# Patient Record
Sex: MALE | Race: OTHER
[De-identification: names, ages, dates, MRNs, and addresses within clinical notes are randomized per-mention and may not be internally consistent; named-entity substitution may affect disease eponyms.]

---

## 2020-06-01 PROBLEM — Z00.00 ENCOUNTER FOR PREVENTIVE HEALTH EXAMINATION: Status: ACTIVE | Noted: 2020-06-01

## 2020-08-14 ENCOUNTER — TRANSCRIPTION ENCOUNTER (OUTPATIENT)
Age: 20
End: 2020-08-14

## 2020-11-23 ENCOUNTER — APPOINTMENT (OUTPATIENT)
Dept: UROLOGY | Facility: CLINIC | Age: 20
End: 2020-11-23
Payer: COMMERCIAL

## 2020-11-23 VITALS
SYSTOLIC BLOOD PRESSURE: 126 MMHG | WEIGHT: 184 LBS | RESPIRATION RATE: 14 BRPM | DIASTOLIC BLOOD PRESSURE: 82 MMHG | BODY MASS INDEX: 24.92 KG/M2 | HEIGHT: 72 IN | HEART RATE: 89 BPM | OXYGEN SATURATION: 95 % | TEMPERATURE: 98.2 F

## 2020-11-23 DIAGNOSIS — K90.0 CELIAC DISEASE: ICD-10-CM

## 2020-11-23 DIAGNOSIS — Z78.9 OTHER SPECIFIED HEALTH STATUS: ICD-10-CM

## 2020-11-23 DIAGNOSIS — Z80.3 FAMILY HISTORY OF MALIGNANT NEOPLASM OF BREAST: ICD-10-CM

## 2020-11-23 DIAGNOSIS — Z80.1 FAMILY HISTORY OF MALIGNANT NEOPLASM OF TRACHEA, BRONCHUS AND LUNG: ICD-10-CM

## 2020-11-23 DIAGNOSIS — F17.200 NICOTINE DEPENDENCE, UNSPECIFIED, UNCOMPLICATED: ICD-10-CM

## 2020-11-23 DIAGNOSIS — Z65.8 OTHER SPECIFIED PROBLEMS RELATED TO PSYCHOSOCIAL CIRCUMSTANCES: ICD-10-CM

## 2020-11-23 PROCEDURE — 99203 OFFICE O/P NEW LOW 30 MIN: CPT

## 2020-11-23 RX ORDER — LISDEXAMFETAMINE DIMESYLATE 40 MG/1
40 CAPSULE ORAL
Qty: 30 | Refills: 0 | Status: DISCONTINUED | COMMUNITY
Start: 2020-10-26

## 2020-11-23 RX ORDER — LISDEXAMFETAMINE DIMESYLATE 30 MG/1
30 CAPSULE ORAL
Qty: 30 | Refills: 0 | Status: DISCONTINUED | COMMUNITY
Start: 2020-10-26

## 2020-11-23 RX ORDER — VORTIOXETINE 20 MG/1
20 TABLET, FILM COATED ORAL
Qty: 90 | Refills: 0 | Status: ACTIVE | COMMUNITY
Start: 2020-06-06

## 2020-11-23 RX ORDER — SILDENAFIL 25 MG/1
25 TABLET ORAL
Qty: 3 | Refills: 0 | Status: DISCONTINUED | COMMUNITY
Start: 2020-11-07

## 2020-11-23 NOTE — REVIEW OF SYSTEMS
[Loss of interest] : loss of interest in sexual activity [Poor quality erections] : Poor quality erections [No erections] : no erections [Anxiety] : anxiety [Depression] : depression [Negative] : Heme/Lymph [Recent Weight Gain (___ Lbs)] : recent [unfilled] ~Ulb weight gain [Erectile Dysfunction] : erectile dysfunction

## 2020-11-23 NOTE — HISTORY OF PRESENT ILLNESS
[FreeTextEntry1] : He is a 20-year-old man who is seen today for initial visit. For more than 6 months, he is complaining of decreased interest for sexual desire and also difficulty maintaining and obtaining erections. He does not have significant urinary symptoms. He has history of panic disorder. He once tried Viagra with success. Labs showed testosterone 478 in August 2020. He also has gained weight which has been difficult for him to lose. He is trying to work out but has not gained any muscle mass, according to the patient. He is supposed to see endocrinology soon.

## 2020-11-23 NOTE — ASSESSMENT
[FreeTextEntry1] : We discussed the underlying mechanism for erections, pathophysiology of erectile dysfunction (ED) and treatment options. Role of smoking, diabetes, hypertension, hyperlipidemia, coronary artery disease and treatment for benign and malignant prostate conditions was discussed as some of the more common causes of ED. Exercise, weight loss and a healthy lifestyle can be beneficial. We discussed testosterone (T) and its possible link to increased desire for sexual activity, feeling energetic, muscle mass, etc. Low T as a cause for ED is less clear. Mechanism by which PDE-5 inhibitors improve erections was discussed. Medications in this category include Viagra, Levitra, Staxyn, Cialis and Stendra. As needed versus daily dosing was discussed. Also, use of vacuum erection device, urethral suppository (MUSE), intracavernosal injections (Edex, Trimix, Caverject) and surgical placement of inflatable penile prosthesis were discussed in detail. Side effects of each treatment was reviewed and questions were answered. Labs were normal. He can try daily Cialis first. Side effects discussed.

## 2020-11-23 NOTE — PHYSICAL EXAM
[General Appearance - Well Developed] : well developed [General Appearance - Well Nourished] : well nourished [Normal Appearance] : normal appearance [Well Groomed] : well groomed [General Appearance - In No Acute Distress] : no acute distress [Edema] : no peripheral edema [Respiration, Rhythm And Depth] : normal respiratory rhythm and effort [Exaggerated Use Of Accessory Muscles For Inspiration] : no accessory muscle use [Abdomen Soft] : soft [Abdomen Tenderness] : non-tender [Costovertebral Angle Tenderness] : no ~M costovertebral angle tenderness [Urethral Meatus] : meatus normal [Penis Abnormality] : normal circumcised penis [Urinary Bladder Findings] : the bladder was normal on palpation [Scrotum] : the scrotum was normal [Epididymis] : the epididymides were normal [Testes Tenderness] : no tenderness of the testes [Testes Mass (___cm)] : there were no testicular masses [Normal Station and Gait] : the gait and station were normal for the patient's age [] : no rash [No Focal Deficits] : no focal deficits [Oriented To Time, Place, And Person] : oriented to person, place, and time [Affect] : the affect was normal [Mood] : the mood was normal [Not Anxious] : not anxious [Inguinal Lymph Nodes Enlarged Bilaterally] : inguinal

## 2021-05-07 ENCOUNTER — APPOINTMENT (OUTPATIENT)
Dept: UROLOGY | Facility: CLINIC | Age: 21
End: 2021-05-07

## 2021-11-29 ENCOUNTER — RX RENEWAL (OUTPATIENT)
Age: 21
End: 2021-11-29

## 2022-10-19 ENCOUNTER — NON-APPOINTMENT (OUTPATIENT)
Age: 22
End: 2022-10-19

## 2022-11-16 ENCOUNTER — APPOINTMENT (OUTPATIENT)
Dept: ENDOCRINOLOGY | Facility: CLINIC | Age: 22
End: 2022-11-16

## 2022-11-16 VITALS
TEMPERATURE: 98.1 F | RESPIRATION RATE: 14 BRPM | SYSTOLIC BLOOD PRESSURE: 138 MMHG | OXYGEN SATURATION: 96 % | DIASTOLIC BLOOD PRESSURE: 85 MMHG | WEIGHT: 209 LBS | HEART RATE: 72 BPM | BODY MASS INDEX: 28.31 KG/M2 | HEIGHT: 72 IN

## 2022-11-16 DIAGNOSIS — I10 ESSENTIAL (PRIMARY) HYPERTENSION: ICD-10-CM

## 2022-11-16 DIAGNOSIS — R94.6 ABNORMAL RESULTS OF THYROID FUNCTION STUDIES: ICD-10-CM

## 2022-11-16 DIAGNOSIS — R68.82 DECREASED LIBIDO: ICD-10-CM

## 2022-11-16 DIAGNOSIS — N52.9 MALE ERECTILE DYSFUNCTION, UNSPECIFIED: ICD-10-CM

## 2022-11-16 PROCEDURE — 99204 OFFICE O/P NEW MOD 45 MIN: CPT

## 2022-11-16 RX ORDER — VORTIOXETINE 10 MG/1
10 TABLET, FILM COATED ORAL
Qty: 90 | Refills: 0 | Status: ACTIVE | COMMUNITY
Start: 2022-08-30

## 2022-11-16 RX ORDER — CLONAZEPAM 0.5 MG/1
0.5 TABLET ORAL
Qty: 60 | Refills: 0 | Status: COMPLETED | COMMUNITY
Start: 2020-11-06 | End: 2022-11-16

## 2022-11-16 RX ORDER — LISDEXAMFETAMINE DIMESYLATE 30 MG/1
30 CAPSULE ORAL
Refills: 0 | Status: ACTIVE | COMMUNITY
Start: 2022-11-16

## 2022-11-16 RX ORDER — TADALAFIL 5 MG/1
5 TABLET ORAL
Qty: 30 | Refills: 5 | Status: COMPLETED | COMMUNITY
Start: 2020-11-23 | End: 2022-11-16

## 2022-11-16 RX ORDER — ATENOLOL 50 MG/1
50 TABLET ORAL
Qty: 90 | Refills: 0 | Status: ACTIVE | COMMUNITY
Start: 2022-04-25

## 2022-11-16 NOTE — CONSULT LETTER
[Dear  ___] : Dear  [unfilled], [Consult Letter:] : I had the pleasure of evaluating your patient, [unfilled]. [Please see my note below.] : Please see my note below. [Consult Closing:] : Thank you very much for allowing me to participate in the care of this patient.  If you have any questions, please do not hesitate to contact me. [Sincerely,] : Sincerely, [FreeTextEntry3] : Patrick Godoy DO\par Division of Endocrinology\par [facility]\par [facility]\par  of Medicine\par [employer] [employer]\par Director of [facility] [FreeTextEntry2] : Dr. Omayra Ramires\par [address]\[place] [address]

## 2022-11-16 NOTE — REASON FOR VISIT
[Initial Evaluation] : an initial evaluation [Hypogonadism] : hypogonadism [FreeTextEntry2] : Dr. Omayra Ramires

## 2022-11-16 NOTE — REVIEW OF SYSTEMS
[Fatigue] : fatigue [Recent Weight Gain (___ Lbs)] : recent weight gain: [unfilled] lbs [Visual Field Defect] : no visual field defect [Dysphagia] : no dysphagia [Dysphonia] : no dysphonia [Chest Pain] : no chest pain [Palpitations] : palpitations [Shortness Of Breath] : no shortness of breath [Nausea] : no nausea [Constipation] : no constipation [Vomiting] : no vomiting [Diarrhea] : no diarrhea [Polyuria] : no polyuria [Joint Pain] : no joint pain [Headaches] : no headaches [Anxiety] : anxiety [Polydipsia] : no polydipsia [Cold Intolerance] : no cold intolerance [Heat Intolerance] : no heat intolerance [All other systems negative] : All other systems negative [FreeTextEntry5] : with panic attacks

## 2022-11-16 NOTE — ASSESSMENT
[FreeTextEntry1] : 21 y/o M w/ multiple nonspecific concerns as below:\par \par 1. Erectile Dysfunction- Had been seen by endocrine and urology back in 2020 with relatively normal testosterone. ED is less of an issue currently and more concerned about libido but still with inconsistent spontaneous erections and decreased quality of erections. Suspect multifactorial causes such as psychogenic, medication induced from atenolol, Trintellix, and/or Vyvanse (all of which have reports up to 3-5% of erectile or sexual dysfunction). \par \par 2. Low libido- also likely multifactorial but can recheck testosterone with sex hormone binding globulin, prolactin, TFTs. Recommend sleep apnea screen given weight gain and hx of snoring and nighttime awakenings. \par \par 3. Abnormal TFTs- last TFTs 6/2022 with borderline TSH. Repeat today to assess for hypothyroidism as cause for symptoms such as abnormal weight gain and fatigue. \par \par 4. HTN- Would workup for secondary causes given young age with HTN. Check aldosterone, renin, plasma, metanephrines, salivary cortisol for cushing's screen. Consider alternative agent than atenolol with less risk of sexual dysfunction and better BP control, although atenolol would be a good choice for hx of panic disorder.\par \par 5. Abnormal weight gain- expect some weight gain as patient now working from home virtually and less active but still exercising and eating habits have no changed. Would not have anticipated 46 lb. gain in 1 year unless medication dose increases have caused some weight gain. Would r/o Cushing's as there is some axillary striae on exam also with HTN. Check TFTs. Dietary and lifestyle modifications discussed.

## 2022-11-16 NOTE — DATA REVIEWED
[FreeTextEntry1] : Labs 6/2022:\par CBC normal\par CMP normal except AST of 49 and ALT 50\par A1c 5.3%\par Folate 16.5\par Estradiol 33\par TSH 4.44\par T4 7.0\par T3 124\par Thyroid Ab neg.\par Vit D 34.2\par Chol 214\par HDL 46\par \par \par B12 670\par DHEA 793\par Celiac weakly postive\par Testosterone 371\par DHT 18

## 2022-11-16 NOTE — PHYSICAL EXAM
[Alert] : alert [Well Nourished] : well nourished [Healthy Appearance] : healthy appearance [No Acute Distress] : no acute distress [Well Developed] : well developed [EOMI] : extra ocular movement intact [No Proptosis] : no proptosis [Normal Hearing] : hearing was normal [Supple] : the neck was supple [Thyroid Not Enlarged] : the thyroid was not enlarged [No Accessory Muscle Use] : no accessory muscle use [No Respiratory Distress] : no respiratory distress [Normal Rate and Effort] : normal respiratory rate and effort [Normal S1, S2] : normal S1 and S2 [Clear to Auscultation] : lungs were clear to auscultation bilaterally [Normal Rate] : heart rate was normal [Regular Rhythm] : with a regular rhythm [No Edema] : no peripheral edema [No Galactorrhea] : no galactorrhea [Normal Bowel Sounds] : normal bowel sounds [Not Tender] : non-tender [Not Distended] : not distended [Soft] : abdomen soft [Penis Abnormality] : the penis was normal [Testes Swelling] : the testicles were not swollen [Scrotum] : the scrotum was normal [Testes Mass (___cm)] : there were no testicular masses [Normal Testicular Size] : normal testicular size [Normal Pattern Pubic Hair] : normal male pattern pubic hair [No Clubbing, Cyanosis] : no clubbing  or cyanosis of the fingernails [No Involuntary Movements] : no involuntary movements were seen [Normal Strength/Tone] : muscle strength and tone were normal [Abdominal Striae] : abdominal striae present [Oriented x3] : oriented to person, place, and time [Normal Affect] : the affect was normal [Normal Mood] : the mood was normal [de-identified] : +axillary striae

## 2022-11-16 NOTE — HISTORY OF PRESENT ILLNESS
[FreeTextEntry1] : Referred to endocrinology in 2020 \par Seen by urology in 2020 for 6 months hx of decreased interest for sexual desire and also difficulty maintaining and obtaining erections. He did not have significant urinary symptoms. He has history of panic disorder. He once tried Viagra with success. Labs showed testosterone 478 in August 2020. He also had gained weight which had been difficult for him to lose. Recommended trial of Cialis at that time.\par Stopped using Cialis. Noticed erections not as strong.\par Around 11/2021 noticed hair loss in the back of his head. Recommended Minoxidil. Also noticed weight gain 46 lb. in 1 year mostly in waist and chest. \par Has noticed worsening libido, energy level, increased need to nap. \par Works out daily and very active.  \par Never on anabolic steroids. Never on testosterone. Stopped smoking Marijuana around in 2019.\par Started on Trintellix around 2015. Increased by 10 mg summer 2022. Has noticed increased panic attacks with symptoms of dizziness, palpitations, hyperventilation.\par Started on atenolol for HTN in 2020. Also on Vyvanse 50 mg daily \par Presents today for evaluation.\par No headaches, no changes in vision, nausea, vomiting, chest pain, SOB, LE edema, galactorrhea. +gynecomastia. \par No personal hx of DM. No change in shoe or hat size. \par Has noticed increase in abdominal striae and striae under axilla. \par +morning erections but less often. Concerned about quality of erections and libido. \par +snoring. Takes melatonin for sleep.

## 2022-11-22 ENCOUNTER — RESULT REVIEW (OUTPATIENT)
Age: 22
End: 2022-11-22

## 2022-11-22 LAB
ALBUMIN SERPL ELPH-MCNC: 5.1 G/DL
ALDOSTERONE SERUM: 11.2 NG/DL
ALP BLD-CCNC: 111 U/L
ALT SERPL-CCNC: 54 U/L
ANION GAP SERPL CALC-SCNC: 13 MMOL/L
AST SERPL-CCNC: 35 U/L
BASOPHILS # BLD AUTO: 0.08 K/UL
BASOPHILS NFR BLD AUTO: 1.3 %
BILIRUB SERPL-MCNC: 0.3 MG/DL
BUN SERPL-MCNC: 20 MG/DL
CALCIUM SERPL-MCNC: 10.1 MG/DL
CHLORIDE SERPL-SCNC: 105 MMOL/L
CHOLEST SERPL-MCNC: 230 MG/DL
CO2 SERPL-SCNC: 23 MMOL/L
CORTIS SERPL-MCNC: 11.7 UG/DL
CREAT SERPL-MCNC: 0.93 MG/DL
DHEA-S SERPL-MCNC: 919 UG/DL
EGFR: 118 ML/MIN/1.73M2
EOSINOPHIL # BLD AUTO: 0.23 K/UL
EOSINOPHIL NFR BLD AUTO: 3.8 %
ESTIMATED AVERAGE GLUCOSE: 108 MG/DL
GLUCOSE SERPL-MCNC: 104 MG/DL
HBA1C MFR BLD HPLC: 5.4 %
HCT VFR BLD CALC: 50 %
HDLC SERPL-MCNC: 51 MG/DL
HGB BLD-MCNC: 16.6 G/DL
IMM GRANULOCYTES NFR BLD AUTO: 0.2 %
LDLC SERPL CALC-MCNC: 148 MG/DL
LYMPHOCYTES # BLD AUTO: 1.75 K/UL
LYMPHOCYTES NFR BLD AUTO: 28.7 %
MAN DIFF?: NORMAL
MCHC RBC-ENTMCNC: 28.7 PG
MCHC RBC-ENTMCNC: 33.2 GM/DL
MCV RBC AUTO: 86.4 FL
METANEPHRINE, PL: 79.3 PG/ML
MONOCYTES # BLD AUTO: 0.52 K/UL
MONOCYTES NFR BLD AUTO: 8.5 %
NEUTROPHILS # BLD AUTO: 3.51 K/UL
NEUTROPHILS NFR BLD AUTO: 57.5 %
NONHDLC SERPL-MCNC: 179 MG/DL
NORMETANEPHRINE, PL: 215.2 PG/ML
PLATELET # BLD AUTO: 256 K/UL
POTASSIUM SERPL-SCNC: 4.7 MMOL/L
PROLACTIN SERPL-MCNC: 18.4 NG/ML
PROT SERPL-MCNC: 7.2 G/DL
RBC # BLD: 5.79 M/UL
RBC # FLD: 12.9 %
SHBG SERPL-SCNC: 15 NMOL/L
SODIUM SERPL-SCNC: 141 MMOL/L
T4 FREE SERPL-MCNC: 1.2 NG/DL
TESTOST SERPL-MCNC: 289 NG/DL
TRIGL SERPL-MCNC: 155 MG/DL
TSH SERPL-ACNC: 2.35 UIU/ML
WBC # FLD AUTO: 6.1 K/UL

## 2022-11-27 ENCOUNTER — APPOINTMENT (OUTPATIENT)
Dept: CT IMAGING | Facility: IMAGING CENTER | Age: 22
End: 2022-11-27

## 2022-11-28 LAB
MONOMERIC PROLACTIN (ICMA)*: 14.1 NG/ML
PERCENT MACROPROLACTIN: 36 %
PROLACTIN, SERUM (ICMA)*: 22 NG/ML
RENIN ACTIVITY, PLASMA: 1.8 NG/ML/HR

## 2022-12-02 LAB
CORTIS SAL-MCNC: NORMAL
CORTIS SAL-MCNC: NORMAL

## 2022-12-20 ENCOUNTER — APPOINTMENT (OUTPATIENT)
Dept: CT IMAGING | Facility: CLINIC | Age: 22
End: 2022-12-20

## 2022-12-20 ENCOUNTER — OUTPATIENT (OUTPATIENT)
Dept: OUTPATIENT SERVICES | Facility: HOSPITAL | Age: 22
LOS: 1 days | End: 2022-12-20
Payer: COMMERCIAL

## 2022-12-20 DIAGNOSIS — R79.89 OTHER SPECIFIED ABNORMAL FINDINGS OF BLOOD CHEMISTRY: ICD-10-CM

## 2022-12-20 PROCEDURE — 74150 CT ABDOMEN W/O CONTRAST: CPT | Mod: 26

## 2022-12-20 PROCEDURE — 74150 CT ABDOMEN W/O CONTRAST: CPT

## 2022-12-23 ENCOUNTER — NON-APPOINTMENT (OUTPATIENT)
Age: 22
End: 2022-12-23

## 2023-01-03 ENCOUNTER — TRANSCRIPTION ENCOUNTER (OUTPATIENT)
Age: 23
End: 2023-01-03

## 2023-02-03 DIAGNOSIS — R63.5 ABNORMAL WEIGHT GAIN: ICD-10-CM

## 2023-02-03 DIAGNOSIS — R79.89 OTHER SPECIFIED ABNORMAL FINDINGS OF BLOOD CHEMISTRY: ICD-10-CM
